# Patient Record
Sex: MALE | Race: BLACK OR AFRICAN AMERICAN | NOT HISPANIC OR LATINO | URBAN - METROPOLITAN AREA
[De-identification: names, ages, dates, MRNs, and addresses within clinical notes are randomized per-mention and may not be internally consistent; named-entity substitution may affect disease eponyms.]

---

## 2017-07-13 ENCOUNTER — EMERGENCY (EMERGENCY)
Facility: HOSPITAL | Age: 52
LOS: 1 days | Discharge: HOME | End: 2017-07-13
Attending: EMERGENCY MEDICINE | Admitting: EMERGENCY MEDICINE
Payer: MEDICARE

## 2017-07-13 VITALS
WEIGHT: 184.97 LBS | RESPIRATION RATE: 16 BRPM | DIASTOLIC BLOOD PRESSURE: 72 MMHG | HEIGHT: 73 IN | SYSTOLIC BLOOD PRESSURE: 106 MMHG | HEART RATE: 138 BPM | TEMPERATURE: 99 F | OXYGEN SATURATION: 99 %

## 2017-07-13 VITALS
HEART RATE: 88 BPM | OXYGEN SATURATION: 100 % | DIASTOLIC BLOOD PRESSURE: 64 MMHG | RESPIRATION RATE: 16 BRPM | SYSTOLIC BLOOD PRESSURE: 116 MMHG | TEMPERATURE: 98 F

## 2017-07-13 DIAGNOSIS — F20.9 SCHIZOPHRENIA, UNSPECIFIED: ICD-10-CM

## 2017-07-13 DIAGNOSIS — Z79.899 OTHER LONG TERM (CURRENT) DRUG THERAPY: ICD-10-CM

## 2017-07-13 DIAGNOSIS — R53.1 WEAKNESS: ICD-10-CM

## 2017-07-13 PROCEDURE — 99283 EMERGENCY DEPT VISIT LOW MDM: CPT | Mod: 25

## 2017-07-13 PROCEDURE — 99282 EMERGENCY DEPT VISIT SF MDM: CPT

## 2017-07-13 NOTE — ED PROVIDER NOTE - ATTENDING CONTRIBUTION TO CARE
51M hx schizophrenia, requesting place ot rest. pt states he is currently homeless.  pt with multiple changing complaints. denies pain. no SI/HI.  denies hallucinations.  gen- nad  heent- ncat, clear conj  cv -rrr  lungs -ctab  abd - soft, nt, nd  ext -wwp, no edema  neuro -aox3, steady gait, torres  no focal neuro deficits, tangential, however pt denies SI, found smoking in the bathroom.  advised to f/u with psychiatrist

## 2017-07-13 NOTE — ED ADULT NURSE NOTE - CHIEF COMPLAINT QUOTE
Pt presents to [facility] ED asking for a sandwich and a place to rest, and a medical evaluation.

## 2017-07-13 NOTE — ED PROVIDER NOTE - OBJECTIVE STATEMENT
patient states needs a break from the streets and Hx limited to little else denies overt needs denies SI

## 2017-07-13 NOTE — ED ADULT TRIAGE NOTE - ARRIVAL INFO ADDITIONAL COMMENTS
Pt to the ER c/o "pain."  Pt states "walking with this heavy bag made my chest hurt, but I am all better now." Pt states he is undomiciled  and requesting rest, and Haldol.  Or maybe some aspirin, and a sandwich."  Pt denies CP, SOB, N/V/D, fevers, sick contacts, or travel out of the country in the last 3 weeks.  Pt denies drug or alcohol use, audio/visual/tactile hallucinations.  Pt denies any injury or trauma.

## 2017-07-13 NOTE — ED PROVIDER NOTE - MEDICAL DECISION MAKING DETAILS
patient known to arrive early in AM on occasion Denies SI/HI though often with bizarre behavior + smoking in lavatory and as such with no overt SI/HI concerns and no medical complaint  d/c to outpatient care

## 2017-08-11 ENCOUNTER — EMERGENCY (EMERGENCY)
Facility: HOSPITAL | Age: 52
LOS: 1 days | Discharge: HOME | End: 2017-08-11
Attending: EMERGENCY MEDICINE | Admitting: EMERGENCY MEDICINE
Payer: MEDICARE

## 2017-08-11 VITALS
OXYGEN SATURATION: 99 % | SYSTOLIC BLOOD PRESSURE: 136 MMHG | RESPIRATION RATE: 17 BRPM | TEMPERATURE: 99 F | HEART RATE: 105 BPM | WEIGHT: 160.06 LBS | DIASTOLIC BLOOD PRESSURE: 72 MMHG

## 2017-08-11 VITALS
DIASTOLIC BLOOD PRESSURE: 76 MMHG | OXYGEN SATURATION: 98 % | HEART RATE: 88 BPM | TEMPERATURE: 99 F | SYSTOLIC BLOOD PRESSURE: 142 MMHG | RESPIRATION RATE: 16 BRPM

## 2017-08-11 DIAGNOSIS — Z79.899 OTHER LONG TERM (CURRENT) DRUG THERAPY: ICD-10-CM

## 2017-08-11 DIAGNOSIS — Z87.891 PERSONAL HISTORY OF NICOTINE DEPENDENCE: ICD-10-CM

## 2017-08-11 DIAGNOSIS — F20.9 SCHIZOPHRENIA, UNSPECIFIED: ICD-10-CM

## 2017-08-11 PROCEDURE — 93005 ELECTROCARDIOGRAM TRACING: CPT

## 2017-08-11 PROCEDURE — 99284 EMERGENCY DEPT VISIT MOD MDM: CPT | Mod: 25

## 2017-08-11 PROCEDURE — 99283 EMERGENCY DEPT VISIT LOW MDM: CPT | Mod: 25

## 2017-08-11 PROCEDURE — 93010 ELECTROCARDIOGRAM REPORT: CPT

## 2017-08-11 RX ORDER — HALOPERIDOL DECANOATE 100 MG/ML
10 INJECTION INTRAMUSCULAR ONCE
Qty: 0 | Refills: 0 | Status: COMPLETED | OUTPATIENT
Start: 2017-08-11 | End: 2017-08-11

## 2017-08-11 RX ADMIN — HALOPERIDOL DECANOATE 10 MILLIGRAM(S): 100 INJECTION INTRAMUSCULAR at 14:03

## 2017-08-11 NOTE — ED PROVIDER NOTE - MEDICAL DECISION MAKING DETAILS
Pt afVSS, has h/o schizophrenia and multiple similar visits for medication refill. Pt continues to deny SI. Do not feel pt represents acute risk of harm to self nor others. Provided with dose of medication in the ED and will discharge to continue is f/u as outpatient in [place].

## 2017-08-11 NOTE — ED ADULT NURSE NOTE - OBJECTIVE STATEMENT
Patient states hearing voices telling him to kill self;  does not elaborate on any specific plan.  Denies HI.

## 2017-08-11 NOTE — ED ADULT NURSE REASSESSMENT NOTE - NS ED NURSE REASSESS COMMENT FT1
Patient re-valauted by Dr. Steen, a/oX 3, no c/o of SI , HI or hallucinations, no pain or other symptom complaints.  Vital signs stable.  SI precautions discontinued by MD and belongings returned to patient.  Haldol 10mg PO administered prior to discharge.  Food and fluids PO tolerated well.  Discharge instruction reviewed w/ patient and verblaized understanding. Discharged to home in stable condition.

## 2017-08-11 NOTE — ED PROVIDER NOTE - PHYSICAL EXAMINATION
CONSTITUTIONAL: Well-appearing; well-nourished; in no apparent distress.   HEAD: Normocephalic; atraumatic.   EYES: PERRL; EOM intact; conjunctiva and sclera clear  ENT: normal nose; no rhinorrhea  NECK: Supple; non-tender;   CARDIOVASCULAR: Normal S1, S2; no murmurs, rubs, or gallops. Regular rate and rhythm.   RESPIRATORY: Breathing easily; breath sounds clear and equal bilaterally; no wheezes, rhonchi, or rales.  GI: Soft; non-distended; non-tender; no palpable organomegaly.   EXT: No cyanosis or edema; N/V intact  SKIN: Normal for age and race; warm; dry; good turgor;   NEURO: A & O x 3; face symmetric; grossly unremarkable.   PSYCHOLOGICAL: The patient’s mood and manner are appropriate. + tangential thoughts, can focus, denies SI, no FOI

## 2017-08-11 NOTE — ED PROVIDER NOTE - OBJECTIVE STATEMENT
52yo man with known h/o schizophrenia presents with c/o stating that he needs to rest a bit. States he also needs his medications. Pt was recently at [facility] per his arm band and states he was also there to rest. Pt initially stated to triage he was hearing voices telling him to kill himself. Pt states he has no SI, has no plan, and continues to deny SI. Pt states he has not been regularly taking his medication.

## 2022-06-13 ENCOUNTER — EMERGENCY (EMERGENCY)
Facility: HOSPITAL | Age: 57
LOS: 1 days | Discharge: LEFT BEFORE DISCHARGE | End: 2022-06-13
Admitting: EMERGENCY MEDICINE
Payer: MEDICARE

## 2022-06-13 VITALS
OXYGEN SATURATION: 96 % | SYSTOLIC BLOOD PRESSURE: 145 MMHG | RESPIRATION RATE: 18 BRPM | HEART RATE: 90 BPM | TEMPERATURE: 99 F | WEIGHT: 192.9 LBS | HEIGHT: 71 IN | DIASTOLIC BLOOD PRESSURE: 93 MMHG

## 2022-06-13 DIAGNOSIS — R07.89 OTHER CHEST PAIN: ICD-10-CM

## 2022-06-13 DIAGNOSIS — Z53.21 PROCEDURE AND TREATMENT NOT CARRIED OUT DUE TO PATIENT LEAVING PRIOR TO BEING SEEN BY HEALTH CARE PROVIDER: ICD-10-CM

## 2022-06-13 PROCEDURE — L9991: CPT

## 2022-06-13 NOTE — ED ADULT TRIAGE NOTE - DOMESTIC TRAVEL HIGH RISK QUESTION
Refill is per verbal order of Dr. Mark Lopez.     Requested Prescriptions     Pending Prescriptions Disp Refills    mexiletine (MEXITIL) 250 mg capsule [Pharmacy Med Name: MEXILETINE CAP 250MG] 180 Cap 0     Sig: TAKE 1 CAPSULE TWICE DAILY No

## 2022-06-13 NOTE — ED ADULT NURSE NOTE - OBJECTIVE STATEMENT
Pt is 56 y.o male client complaining of chest discomfort for 2 days. Pt is noted to have seen other hospitals [place] complain. Pt A%Ox4. Pt is able to communicate and has a steady gait. Pt denies any known medical history. Per pt "I feel the pain and tightness and when I take I deep breathe on top of my chest and I need an aspirin". Pt denies dizziness, HA, n&v, lightheadedness, sob, weakness, tingling, loc, blurred vison. fever and chills.

## 2022-06-13 NOTE — ED ADULT TRIAGE NOTE - CHIEF COMPLAINT QUOTE
Pt reports chest pain x2 days. Pt reports he was seen at [facility] hospitals for same. Pt also reports he has "been on my feet for too long."
